# Patient Record
Sex: MALE
[De-identification: names, ages, dates, MRNs, and addresses within clinical notes are randomized per-mention and may not be internally consistent; named-entity substitution may affect disease eponyms.]

---

## 2018-05-22 ENCOUNTER — LABORATORY RESULT (OUTPATIENT)
Age: 33
End: 2018-05-22

## 2018-05-22 ENCOUNTER — APPOINTMENT (OUTPATIENT)
Dept: INTERNAL MEDICINE | Facility: CLINIC | Age: 33
End: 2018-05-22
Payer: COMMERCIAL

## 2018-05-22 VITALS
BODY MASS INDEX: 24.02 KG/M2 | HEIGHT: 71.26 IN | WEIGHT: 173.5 LBS | HEART RATE: 67 BPM | DIASTOLIC BLOOD PRESSURE: 77 MMHG | OXYGEN SATURATION: 98 % | SYSTOLIC BLOOD PRESSURE: 116 MMHG | TEMPERATURE: 98.4 F

## 2018-05-22 DIAGNOSIS — Z80.7 FAMILY HISTORY OF OTHER MALIGNANT NEOPLASMS OF LYMPHOID, HEMATOPOIETIC AND RELATED TISSUES: ICD-10-CM

## 2018-05-22 DIAGNOSIS — Z82.5 FAMILY HISTORY OF ASTHMA AND OTHER CHRONIC LOWER RESPIRATORY DISEASES: ICD-10-CM

## 2018-05-22 DIAGNOSIS — Z82.49 FAMILY HISTORY OF ISCHEMIC HEART DISEASE AND OTHER DISEASES OF THE CIRCULATORY SYSTEM: ICD-10-CM

## 2018-05-22 DIAGNOSIS — Z78.9 OTHER SPECIFIED HEALTH STATUS: ICD-10-CM

## 2018-05-22 DIAGNOSIS — Z82.3 FAMILY HISTORY OF STROKE: ICD-10-CM

## 2018-05-22 PROCEDURE — 36415 COLL VENOUS BLD VENIPUNCTURE: CPT

## 2018-05-22 PROCEDURE — 99385 PREV VISIT NEW AGE 18-39: CPT | Mod: 25

## 2018-05-22 NOTE — HISTORY OF PRESENT ILLNESS
[FreeTextEntry1] : Annual [de-identified] : Pt states his lack of focus is affecting his quality of work.  Constantly distracted and leaving unfinished projects.\par No nausea, vomiting, diarrhea, and constipation. No chest pain or shortness of breath.\par Gym regular, cardio,weights.  Plays golf.  Weight stable.  Cooks at home, fairly healthy.

## 2018-05-22 NOTE — HEALTH RISK ASSESSMENT
[Very Good] : ~his/her~  mood as very good [0] : 2) Feeling down, depressed, or hopeless: Not at all (0) [With Significant Other] : lives with significant other [Employed] : employed [College] : College [] :  [] : No [[id number]] : 0 [Change in mental status noted] : No change in mental status noted

## 2018-05-22 NOTE — HISTORY OF PRESENT ILLNESS
[FreeTextEntry1] : Annual [de-identified] : Pt states his lack of focus is affecting his quality of work.  Constantly distracted and leaving unfinished projects.\par No nausea, vomiting, diarrhea, and constipation. No chest pain or shortness of breath.\par Gym regular, cardio,weights.  Plays golf.  Weight stable.  Cooks at home, fairly healthy.

## 2018-05-22 NOTE — HEALTH RISK ASSESSMENT
[Very Good] : ~his/her~  mood as very good [0] : 2) Feeling down, depressed, or hopeless: Not at all (0) [With Significant Other] : lives with significant other [Employed] : employed [College] : College [] :  [] : No [[id number]] : 0 [Change in mental status noted] : No change in mental status noted

## 2018-05-30 LAB
ALBUMIN SERPL ELPH-MCNC: 4.8 G/DL
ALP BLD-CCNC: 62 U/L
ALT SERPL-CCNC: 14 U/L
ANION GAP SERPL CALC-SCNC: 13 MMOL/L
AST SERPL-CCNC: 17 U/L
BASOPHILS # BLD AUTO: 0.02 K/UL
BASOPHILS NFR BLD AUTO: 0.4 %
BILIRUB SERPL-MCNC: 0.7 MG/DL
BUN SERPL-MCNC: 14 MG/DL
CALCIUM SERPL-MCNC: 9.7 MG/DL
CHLORIDE SERPL-SCNC: 103 MMOL/L
CHOLEST SERPL-MCNC: 182 MG/DL
CHOLEST/HDLC SERPL: 2.4 RATIO
CO2 SERPL-SCNC: 27 MMOL/L
CREAT SERPL-MCNC: 0.72 MG/DL
EOSINOPHIL # BLD AUTO: 0.06 K/UL
EOSINOPHIL NFR BLD AUTO: 1.3 %
GLUCOSE SERPL-MCNC: 91 MG/DL
HCT VFR BLD CALC: 43.5 %
HDLC SERPL-MCNC: 77 MG/DL
HGB BLD-MCNC: 14.5 G/DL
IMM GRANULOCYTES NFR BLD AUTO: 0.2 %
LDLC SERPL CALC-MCNC: 94 MG/DL
LYMPHOCYTES # BLD AUTO: 2.29 K/UL
LYMPHOCYTES NFR BLD AUTO: 48.7 %
MAN DIFF?: NORMAL
MCHC RBC-ENTMCNC: 30.3 PG
MCHC RBC-ENTMCNC: 33.3 GM/DL
MCV RBC AUTO: 91 FL
MONOCYTES # BLD AUTO: 0.33 K/UL
MONOCYTES NFR BLD AUTO: 7 %
NEUTROPHILS # BLD AUTO: 1.99 K/UL
NEUTROPHILS NFR BLD AUTO: 42.4 %
PLATELET # BLD AUTO: 255 K/UL
POTASSIUM SERPL-SCNC: 4.6 MMOL/L
PROT SERPL-MCNC: 8 G/DL
RBC # BLD: 4.78 M/UL
RBC # FLD: 12.9 %
SODIUM SERPL-SCNC: 143 MMOL/L
TRIGL SERPL-MCNC: 54 MG/DL
TSH SERPL-ACNC: 0.89 UIU/ML
VIT B12 SERPL-MCNC: 433 PG/ML
WBC # FLD AUTO: 4.7 K/UL

## 2018-06-24 ENCOUNTER — LABORATORY RESULT (OUTPATIENT)
Age: 33
End: 2018-06-24

## 2018-06-25 ENCOUNTER — APPOINTMENT (OUTPATIENT)
Dept: DERMATOLOGY | Facility: CLINIC | Age: 33
End: 2018-06-25
Payer: COMMERCIAL

## 2018-06-25 VITALS — SYSTOLIC BLOOD PRESSURE: 111 MMHG | DIASTOLIC BLOOD PRESSURE: 64 MMHG

## 2018-06-25 DIAGNOSIS — Z85.828 PERSONAL HISTORY OF OTHER MALIGNANT NEOPLASM OF SKIN: ICD-10-CM

## 2018-06-25 DIAGNOSIS — D48.9 NEOPLASM OF UNCERTAIN BEHAVIOR, UNSPECIFIED: ICD-10-CM

## 2018-06-25 DIAGNOSIS — Z12.83 ENCOUNTER FOR SCREENING FOR MALIGNANT NEOPLASM OF SKIN: ICD-10-CM

## 2018-06-25 PROCEDURE — 11100 BX SKIN SUBCUTANEOUS&/MUCOUS MEMBRANE 1 LESION: CPT

## 2018-06-25 PROCEDURE — 99243 OFF/OP CNSLTJ NEW/EST LOW 30: CPT | Mod: 25

## 2018-07-23 ENCOUNTER — APPOINTMENT (OUTPATIENT)
Dept: DERMATOLOGY | Facility: CLINIC | Age: 33
End: 2018-07-23

## 2018-11-08 ENCOUNTER — APPOINTMENT (OUTPATIENT)
Dept: INTERNAL MEDICINE | Facility: CLINIC | Age: 33
End: 2018-11-08
Payer: COMMERCIAL

## 2018-11-08 VITALS
HEIGHT: 71.26 IN | HEART RATE: 98 BPM | TEMPERATURE: 98.5 F | SYSTOLIC BLOOD PRESSURE: 118 MMHG | WEIGHT: 170 LBS | OXYGEN SATURATION: 95 % | BODY MASS INDEX: 23.54 KG/M2 | DIASTOLIC BLOOD PRESSURE: 74 MMHG

## 2018-11-08 PROCEDURE — 99214 OFFICE O/P EST MOD 30 MIN: CPT

## 2018-11-08 NOTE — PHYSICAL EXAM
[No Acute Distress] : no acute distress [Well Nourished] : well nourished [Normal Sclera/Conjunctiva] : normal sclera/conjunctiva [EOMI] : extraocular movements intact [Normal Gait] : normal gait [Coordination Grossly Intact] : coordination grossly intact [Speech Grossly Normal] : speech grossly normal [Normal Mood] : the mood was normal

## 2018-11-08 NOTE — HISTORY OF PRESENT ILLNESS
[FreeTextEntry1] : Follow up [de-identified] : Here for ADHD follow up.  Have college pediatric notes.  Was on Concerta 36 up to 54 mg QD.  Work improved.  Then after college didn't take meds.  Work focus has decreased, wife sees lack of attention and focus.  Figits.  Doesn't finish projects, late with projects.\par No nausea, vomiting, diarrhea, and constipation. No chest pain or shortness of breath.\par

## 2018-12-05 ENCOUNTER — APPOINTMENT (OUTPATIENT)
Dept: INTERNAL MEDICINE | Facility: CLINIC | Age: 33
End: 2018-12-05

## 2018-12-19 ENCOUNTER — APPOINTMENT (OUTPATIENT)
Dept: INTERNAL MEDICINE | Facility: CLINIC | Age: 33
End: 2018-12-19
Payer: COMMERCIAL

## 2018-12-19 VITALS
DIASTOLIC BLOOD PRESSURE: 78 MMHG | SYSTOLIC BLOOD PRESSURE: 126 MMHG | RESPIRATION RATE: 14 BRPM | BODY MASS INDEX: 23.8 KG/M2 | WEIGHT: 170 LBS | HEIGHT: 71 IN | HEART RATE: 68 BPM | OXYGEN SATURATION: 98 %

## 2018-12-19 PROCEDURE — 99213 OFFICE O/P EST LOW 20 MIN: CPT

## 2018-12-19 NOTE — REVIEW OF SYSTEMS
[Fever] : no fever [Recent Change In Weight] : ~T no recent weight change [Chest Pain] : no chest pain [Palpitations] : no palpitations [Shortness Of Breath] : no shortness of breath [Cough] : no cough [Nausea] : no nausea [Vomiting] : no vomiting [Headache] : no headache [Dizziness] : no dizziness

## 2018-12-19 NOTE — HISTORY OF PRESENT ILLNESS
[FreeTextEntry1] : Follow [de-identified] : Has been taking Adderall BID.  Sometimes not taking second dose.  When takes, can follow and finish a task.  Decreased distraction.  Feels OK on this dose.  Perhaps a little weak but feels ok\par No nausea, vomiting, diarrhea, and constipation. No chest pain or shortness of breath.\par

## 2019-03-14 ENCOUNTER — APPOINTMENT (OUTPATIENT)
Dept: INTERNAL MEDICINE | Facility: CLINIC | Age: 34
End: 2019-03-14
Payer: COMMERCIAL

## 2019-03-14 VITALS
SYSTOLIC BLOOD PRESSURE: 122 MMHG | WEIGHT: 168 LBS | HEIGHT: 71 IN | DIASTOLIC BLOOD PRESSURE: 74 MMHG | OXYGEN SATURATION: 97 % | BODY MASS INDEX: 23.52 KG/M2 | HEART RATE: 84 BPM | TEMPERATURE: 98.1 F

## 2019-03-14 PROCEDURE — 99214 OFFICE O/P EST MOD 30 MIN: CPT

## 2019-03-15 NOTE — REVIEW OF SYSTEMS
[Earache] : earache [Nasal Discharge] : nasal discharge [Fever] : no fever [Night Sweats] : no night sweats [Discharge] : no discharge [Vision Problems] : no vision problems [Chest Pain] : no chest pain [Palpitations] : no palpitations [Shortness Of Breath] : no shortness of breath [Wheezing] : no wheezing [Nausea] : no nausea [Vomiting] : no vomiting [Joint Pain] : no joint pain [Back Pain] : no back pain

## 2019-03-15 NOTE — PLAN
[FreeTextEntry1] : Rest, drink plenty, bland diet, take Tylenol for fever or aches and return to office/ER if worsening symptoms such as increased fever, vomiting, worsening headache, or other concerns.\par Flonase

## 2019-03-15 NOTE — HISTORY OF PRESENT ILLNESS
[FreeTextEntry1] : Sick visit [de-identified] : 5 days ago had malaise, then congestion in nose and sinuses.  No fever.  No cough.  No CHOI/SOB.  No N/V/D/C.\par Has ear congestion.\par ADD med is effective when needed.  Long standing Dx ADD

## 2019-03-15 NOTE — PHYSICAL EXAM
[No Acute Distress] : no acute distress [Well Nourished] : well nourished [Normal Sclera/Conjunctiva] : normal sclera/conjunctiva [EOMI] : extraocular movements intact [Normal Oropharynx] : the oropharynx was normal [No JVD] : no jugular venous distention [No Lymphadenopathy] : no lymphadenopathy [No Respiratory Distress] : no respiratory distress  [Clear to Auscultation] : lungs were clear to auscultation bilaterally [Soft] : abdomen soft [Non Tender] : non-tender [Normal Anterior Cervical Nodes] : no anterior cervical lymphadenopathy [Normal Gait] : normal gait [Coordination Grossly Intact] : coordination grossly intact [Speech Grossly Normal] : speech grossly normal [Normal Mood] : the mood was normal [de-identified] : nasal congestion

## 2019-05-02 ENCOUNTER — APPOINTMENT (OUTPATIENT)
Dept: INTERNAL MEDICINE | Facility: CLINIC | Age: 34
End: 2019-05-02
Payer: COMMERCIAL

## 2019-05-02 VITALS
WEIGHT: 172 LBS | DIASTOLIC BLOOD PRESSURE: 76 MMHG | BODY MASS INDEX: 24.08 KG/M2 | SYSTOLIC BLOOD PRESSURE: 122 MMHG | HEIGHT: 71 IN | OXYGEN SATURATION: 98 % | HEART RATE: 86 BPM

## 2019-05-02 DIAGNOSIS — J06.9 ACUTE UPPER RESPIRATORY INFECTION, UNSPECIFIED: ICD-10-CM

## 2019-05-02 PROCEDURE — 99213 OFFICE O/P EST LOW 20 MIN: CPT

## 2019-05-02 NOTE — REVIEW OF SYSTEMS
[Night Sweats] : no night sweats [Recent Change In Weight] : ~T no recent weight change [Fever] : no fever [Earache] : no earache [Palpitations] : no palpitations [Chest Pain] : no chest pain [Hearing Loss] : no hearing loss [Wheezing] : no wheezing [Nausea] : no nausea [Shortness Of Breath] : no shortness of breath [Vomiting] : no vomiting [Headache] : no headache [Dizziness] : no dizziness

## 2019-05-02 NOTE — PHYSICAL EXAM
[No Acute Distress] : no acute distress [EOMI] : extraocular movements intact [Normal Sclera/Conjunctiva] : normal sclera/conjunctiva [Well Nourished] : well nourished [Normal Oropharynx] : the oropharynx was normal [Normal Outer Ear/Nose] : the outer ears and nose were normal in appearance [No Respiratory Distress] : no respiratory distress  [Regular Rhythm] : with a regular rhythm [Clear to Auscultation] : lungs were clear to auscultation bilaterally [Normal Rate] : normal rate  [Normal Gait] : normal gait [Coordination Grossly Intact] : coordination grossly intact [Speech Grossly Normal] : speech grossly normal [Normal Mood] : the mood was normal

## 2019-05-02 NOTE — HISTORY OF PRESENT ILLNESS
[FreeTextEntry1] : F/U with ADD and med's [de-identified] : Takes ritalin once a day over the work week.  Not on the weekends.  No palps.  Gained 4 lbs since last visit.  Appetite good.

## 2020-03-03 ENCOUNTER — APPOINTMENT (OUTPATIENT)
Dept: INTERNAL MEDICINE | Facility: CLINIC | Age: 35
End: 2020-03-03
Payer: COMMERCIAL

## 2020-03-03 VITALS
BODY MASS INDEX: 24.5 KG/M2 | SYSTOLIC BLOOD PRESSURE: 115 MMHG | DIASTOLIC BLOOD PRESSURE: 72 MMHG | OXYGEN SATURATION: 96 % | TEMPERATURE: 97.9 F | HEIGHT: 71 IN | HEART RATE: 103 BPM | WEIGHT: 175 LBS

## 2020-03-03 DIAGNOSIS — R82.90 UNSPECIFIED ABNORMAL FINDINGS IN URINE: ICD-10-CM

## 2020-03-03 PROCEDURE — 99395 PREV VISIT EST AGE 18-39: CPT | Mod: 25

## 2020-03-03 PROCEDURE — 36415 COLL VENOUS BLD VENIPUNCTURE: CPT

## 2020-03-03 NOTE — HISTORY OF PRESENT ILLNESS
[de-identified] : Pt has been losing focus again since off adderall.  Wasn't able to keep appt's.  Has taken since summer.  No nausea, vomiting, diarrhea, and constipation. No chest pain or shortness of breath.  Gym freq.\par Weight stable.  Eating healthily

## 2020-03-03 NOTE — HEALTH RISK ASSESSMENT
[Very Good] : ~his/her~  mood as very good [0] : 2) Feeling down, depressed, or hopeless: Not at all (0) [With Significant Other] : lives with significant other [None] : None [] :  [Employed] : employed [# Of Children ___] : has [unfilled] children [] : No [[id number]] : 0 [Change in mental status noted] : No change in mental status noted

## 2020-03-03 NOTE — PHYSICAL EXAM
[Well Developed] : well developed [No Acute Distress] : no acute distress [Well Nourished] : well nourished [Normal Sclera/Conjunctiva] : normal sclera/conjunctiva [Well-Appearing] : well-appearing [PERRL] : pupils equal round and reactive to light [EOMI] : extraocular movements intact [Normal Outer Ear/Nose] : the outer ears and nose were normal in appearance [Normal Oropharynx] : the oropharynx was normal [No Lymphadenopathy] : no lymphadenopathy [No JVD] : no jugular venous distention [Supple] : supple [No Respiratory Distress] : no respiratory distress  [Thyroid Normal, No Nodules] : the thyroid was normal and there were no nodules present [Clear to Auscultation] : lungs were clear to auscultation bilaterally [No Accessory Muscle Use] : no accessory muscle use [Regular Rhythm] : with a regular rhythm [Normal Rate] : normal rate  [No Murmur] : no murmur heard [Normal S1, S2] : normal S1 and S2 [No Carotid Bruits] : no carotid bruits [No Abdominal Bruit] : a ~M bruit was not heard ~T in the abdomen [No Varicosities] : no varicosities [Pedal Pulses Present] : the pedal pulses are present [No Edema] : there was no peripheral edema [No Palpable Aorta] : no palpable aorta [No Extremity Clubbing/Cyanosis] : no extremity clubbing/cyanosis [Soft] : abdomen soft [Non Tender] : non-tender [Non-distended] : non-distended [Normal Bowel Sounds] : normal bowel sounds [No Masses] : no abdominal mass palpated [No HSM] : no HSM [Normal Anterior Cervical Nodes] : no anterior cervical lymphadenopathy [Normal Posterior Cervical Nodes] : no posterior cervical lymphadenopathy [No CVA Tenderness] : no CVA  tenderness [No Spinal Tenderness] : no spinal tenderness [No Joint Swelling] : no joint swelling [No Rash] : no rash [Grossly Normal Strength/Tone] : grossly normal strength/tone [No Focal Deficits] : no focal deficits [Coordination Grossly Intact] : coordination grossly intact [Normal Gait] : normal gait [Normal Affect] : the affect was normal [Deep Tendon Reflexes (DTR)] : deep tendon reflexes were 2+ and symmetric [Normal Insight/Judgement] : insight and judgment were intact

## 2020-03-10 LAB
ALBUMIN SERPL ELPH-MCNC: 5.1 G/DL
ALP BLD-CCNC: 64 U/L
ALT SERPL-CCNC: 15 U/L
ANION GAP SERPL CALC-SCNC: 13 MMOL/L
AST SERPL-CCNC: 18 U/L
BASOPHILS # BLD AUTO: 0.04 K/UL
BASOPHILS NFR BLD AUTO: 0.9 %
BILIRUB SERPL-MCNC: 0.6 MG/DL
BUN SERPL-MCNC: 16 MG/DL
CALCIUM SERPL-MCNC: 10.3 MG/DL
CHLORIDE SERPL-SCNC: 105 MMOL/L
CHOLEST SERPL-MCNC: 175 MG/DL
CHOLEST/HDLC SERPL: 2.4 RATIO
CO2 SERPL-SCNC: 25 MMOL/L
CREAT SERPL-MCNC: 0.88 MG/DL
EOSINOPHIL # BLD AUTO: 0.05 K/UL
EOSINOPHIL NFR BLD AUTO: 1.1 %
GLUCOSE SERPL-MCNC: 69 MG/DL
HCT VFR BLD CALC: 43.8 %
HDLC SERPL-MCNC: 73 MG/DL
HGB BLD-MCNC: 14.5 G/DL
IMM GRANULOCYTES NFR BLD AUTO: 0.2 %
LDLC SERPL CALC-MCNC: 80 MG/DL
LYMPHOCYTES # BLD AUTO: 2.11 K/UL
LYMPHOCYTES NFR BLD AUTO: 45.7 %
MAN DIFF?: NORMAL
MCHC RBC-ENTMCNC: 30.2 PG
MCHC RBC-ENTMCNC: 33.1 GM/DL
MCV RBC AUTO: 91.3 FL
MONOCYTES # BLD AUTO: 0.39 K/UL
MONOCYTES NFR BLD AUTO: 8.4 %
NEUTROPHILS # BLD AUTO: 2.02 K/UL
NEUTROPHILS NFR BLD AUTO: 43.7 %
PLATELET # BLD AUTO: 264 K/UL
POTASSIUM SERPL-SCNC: 4.6 MMOL/L
PROT SERPL-MCNC: 7.5 G/DL
RBC # BLD: 4.8 M/UL
RBC # FLD: 12.1 %
SODIUM SERPL-SCNC: 142 MMOL/L
TRIGL SERPL-MCNC: 109 MG/DL
TSH SERPL-ACNC: 1.12 UIU/ML
WBC # FLD AUTO: 4.62 K/UL

## 2020-08-20 ENCOUNTER — APPOINTMENT (OUTPATIENT)
Dept: INTERNAL MEDICINE | Facility: CLINIC | Age: 35
End: 2020-08-20
Payer: COMMERCIAL

## 2020-08-20 VITALS
SYSTOLIC BLOOD PRESSURE: 112 MMHG | WEIGHT: 170 LBS | BODY MASS INDEX: 23.8 KG/M2 | DIASTOLIC BLOOD PRESSURE: 72 MMHG | OXYGEN SATURATION: 100 % | TEMPERATURE: 98.2 F | HEIGHT: 71 IN | HEART RATE: 74 BPM

## 2020-08-20 DIAGNOSIS — L03.011 CELLULITIS OF RIGHT FINGER: ICD-10-CM

## 2020-08-20 PROCEDURE — 99213 OFFICE O/P EST LOW 20 MIN: CPT

## 2020-08-20 NOTE — HISTORY OF PRESENT ILLNESS
[de-identified] : R 4th finger cuticle red, swollen painful.  Improved overnight with warm salt water soaks.\par Also needs refill on his ADD medication\par No nausea, vomiting, diarrhea, and constipation. No chest pain or shortness of breath.\par

## 2020-08-20 NOTE — PHYSICAL EXAM
[No Acute Distress] : no acute distress [Well Nourished] : well nourished [Speech Grossly Normal] : speech grossly normal [Normal Mood] : the mood was normal [de-identified] : erthema tender 4th cuticle.

## 2021-01-13 ENCOUNTER — APPOINTMENT (OUTPATIENT)
Dept: INTERNAL MEDICINE | Facility: CLINIC | Age: 36
End: 2021-01-13
Payer: COMMERCIAL

## 2021-01-13 VITALS
SYSTOLIC BLOOD PRESSURE: 125 MMHG | HEART RATE: 84 BPM | WEIGHT: 170 LBS | OXYGEN SATURATION: 100 % | BODY MASS INDEX: 23.8 KG/M2 | TEMPERATURE: 98.2 F | HEIGHT: 71 IN | DIASTOLIC BLOOD PRESSURE: 78 MMHG

## 2021-01-13 DIAGNOSIS — R48.0 DYSLEXIA AND ALEXIA: ICD-10-CM

## 2021-01-13 PROCEDURE — 99213 OFFICE O/P EST LOW 20 MIN: CPT

## 2021-01-13 PROCEDURE — 99072 ADDL SUPL MATRL&STAF TM PHE: CPT

## 2021-01-13 RX ORDER — CEPHALEXIN 250 MG/1
250 TABLET ORAL EVERY 8 HOURS
Qty: 15 | Refills: 0 | Status: DISCONTINUED | COMMUNITY
Start: 2020-08-20 | End: 2021-01-13

## 2021-01-13 NOTE — PLAN
[FreeTextEntry1] : Possible side effects and possible adverse reactions discussed at length with the patients.  All patient's questions addressed pertaining to medication issues.\par f/u 3 mo

## 2021-01-13 NOTE — PHYSICAL EXAM
[No Acute Distress] : no acute distress [Well Nourished] : well nourished [Normal Sclera/Conjunctiva] : normal sclera/conjunctiva [EOMI] : extraocular movements intact [Normal Outer Ear/Nose] : the outer ears and nose were normal in appearance [No JVD] : no jugular venous distention [No Lymphadenopathy] : no lymphadenopathy [No Respiratory Distress] : no respiratory distress  [Clear to Auscultation] : lungs were clear to auscultation bilaterally [Regular Rhythm] : with a regular rhythm [No Murmur] : no murmur heard [Soft] : abdomen soft [Non Tender] : non-tender [No Rash] : no rash [Coordination Grossly Intact] : coordination grossly intact [Normal Gait] : normal gait [Speech Grossly Normal] : speech grossly normal [Memory Grossly Normal] : memory grossly normal

## 2021-01-13 NOTE — HISTORY OF PRESENT ILLNESS
[FreeTextEntry1] : Follow up [de-identified] : Has been well during the pandemic.  Has tested negative PCR.\par Using the methylphenidate.  Takes at least TIW.  No nausea, vomiting, diarrhea, and constipation. No chest pain or shortness of breath.\par Exercising regular with online exercise classes.

## 2021-01-20 LAB
SARS-COV-2 IGG SERPL IA-ACNC: <0.1 INDEX
SARS-COV-2 IGG SERPL QL IA: NEGATIVE
SARS-COV-2 N GENE NPH QL NAA+PROBE: NOT DETECTED

## 2021-03-10 ENCOUNTER — APPOINTMENT (OUTPATIENT)
Dept: INTERNAL MEDICINE | Facility: CLINIC | Age: 36
End: 2021-03-10
Payer: COMMERCIAL

## 2021-03-10 VITALS
DIASTOLIC BLOOD PRESSURE: 71 MMHG | HEART RATE: 65 BPM | SYSTOLIC BLOOD PRESSURE: 122 MMHG | HEIGHT: 71 IN | BODY MASS INDEX: 24.22 KG/M2 | WEIGHT: 173 LBS | TEMPERATURE: 97.5 F | OXYGEN SATURATION: 98 %

## 2021-03-10 PROCEDURE — 99072 ADDL SUPL MATRL&STAF TM PHE: CPT

## 2021-03-10 PROCEDURE — 99213 OFFICE O/P EST LOW 20 MIN: CPT

## 2021-03-10 NOTE — PHYSICAL EXAM
[No Acute Distress] : no acute distress [Well Nourished] : well nourished [Normal Sclera/Conjunctiva] : normal sclera/conjunctiva [EOMI] : extraocular movements intact [Normal Outer Ear/Nose] : the outer ears and nose were normal in appearance [Normal Oropharynx] : the oropharynx was normal [No JVD] : no jugular venous distention [Supple] : supple [No Respiratory Distress] : no respiratory distress  [No Accessory Muscle Use] : no accessory muscle use [Normal Rate] : normal rate  [Regular Rhythm] : with a regular rhythm [Soft] : abdomen soft [No HSM] : no HSM

## 2021-03-12 NOTE — PLAN
[FreeTextEntry1] : Possible side effects and possible adverse reactions discussed at length with the patients.  All patient's questions addressed pertaining to medication issues.\par Q3mo f/u

## 2021-03-12 NOTE — HISTORY OF PRESENT ILLNESS
[FreeTextEntry1] : F/U ADD [de-identified] : Cardio and weights for exercise.  Protein supplements.\par No nausea, vomiting, diarrhea, and constipation. No chest pain or shortness of breath.\par

## 2021-03-12 NOTE — REVIEW OF SYSTEMS
[Fever] : no fever [Discharge] : no discharge [Vision Problems] : no vision problems [Earache] : no earache [Nasal Discharge] : no nasal discharge [Chest Pain] : no chest pain [Shortness Of Breath] : no shortness of breath [Wheezing] : no wheezing [Nausea] : no nausea [Vomiting] : no vomiting [Dysuria] : no dysuria [Hematuria] : no hematuria

## 2021-06-09 ENCOUNTER — APPOINTMENT (OUTPATIENT)
Dept: INTERNAL MEDICINE | Facility: CLINIC | Age: 36
End: 2021-06-09

## 2021-08-24 ENCOUNTER — NON-APPOINTMENT (OUTPATIENT)
Age: 36
End: 2021-08-24

## 2021-08-24 ENCOUNTER — APPOINTMENT (OUTPATIENT)
Dept: INTERNAL MEDICINE | Facility: CLINIC | Age: 36
End: 2021-08-24
Payer: COMMERCIAL

## 2021-08-24 DIAGNOSIS — A08.4 VIRAL INTESTINAL INFECTION, UNSPECIFIED: ICD-10-CM

## 2021-08-24 PROCEDURE — 99213 OFFICE O/P EST LOW 20 MIN: CPT | Mod: 95

## 2021-08-24 RX ORDER — OMEPRAZOLE 40 MG/1
40 CAPSULE, DELAYED RELEASE ORAL
Qty: 14 | Refills: 0 | Status: ACTIVE | COMMUNITY
Start: 2021-08-24 | End: 1900-01-01

## 2021-08-25 NOTE — PLAN
[FreeTextEntry1] : Possible side effects and possible adverse reactions discussed at length with the patients.  All patient's questions addressed pertaining to medication issues.\par \par 22 min reviewing hx, food and drink intake, progress, past Tx and future mgt.\par \par Drink plenty of fluids, take med's as instructed, eat a BRAT diet until symptoms begin to improve.  RTO/ER if worsening abd pain, fever, dehydration or other concerns.\par \par

## 2021-08-25 NOTE — HISTORY OF PRESENT ILLNESS
[de-identified] : Four days of dyspepsia, no vomiting, some discomfort superior to umbilicus.  No fever or malaise.  Having loose BMs, no BRBPR.  No mucus 3-4 x's a day.  Has been at a few work gatherings eating various foods.  No constipation. No chest pain or shortness of breath.\par

## 2021-08-25 NOTE — REVIEW OF SYSTEMS
[Fever] : no fever [Night Sweats] : no night sweats [Chest Pain] : no chest pain [Palpitations] : no palpitations [Shortness Of Breath] : no shortness of breath [Cough] : no cough [Vomiting] : no vomiting [Melena] : no melena

## 2022-01-10 ENCOUNTER — APPOINTMENT (OUTPATIENT)
Dept: INTERNAL MEDICINE | Facility: CLINIC | Age: 37
End: 2022-01-10
Payer: COMMERCIAL

## 2022-01-10 VITALS
WEIGHT: 180 LBS | DIASTOLIC BLOOD PRESSURE: 77 MMHG | HEART RATE: 76 BPM | OXYGEN SATURATION: 98 % | SYSTOLIC BLOOD PRESSURE: 138 MMHG | BODY MASS INDEX: 25.2 KG/M2 | HEIGHT: 71 IN | TEMPERATURE: 96.2 F

## 2022-01-10 DIAGNOSIS — M25.512 PAIN IN LEFT SHOULDER: ICD-10-CM

## 2022-01-10 DIAGNOSIS — Z11.59 ENCOUNTER FOR SCREENING FOR OTHER VIRAL DISEASES: ICD-10-CM

## 2022-01-10 DIAGNOSIS — G89.29 PAIN IN LEFT SHOULDER: ICD-10-CM

## 2022-01-10 PROCEDURE — 99395 PREV VISIT EST AGE 18-39: CPT | Mod: 25

## 2022-01-10 PROCEDURE — 36415 COLL VENOUS BLD VENIPUNCTURE: CPT

## 2022-01-10 NOTE — HEALTH RISK ASSESSMENT
[Very Good] : ~his/her~  mood as very good [Never] : Never [0] : 2) Feeling down, depressed, or hopeless: Not at all (0) [None] : None [With Family] : lives with family [Employed] : employed [] :  [[id number]] : 0 [Language] : denies difficulty with language

## 2022-01-10 NOTE — HISTORY OF PRESENT ILLNESS
[de-identified] : No nausea, vomiting, diarrhea, and constipation. No chest pain or shortness of breath.  Uses stimulant med during work week.  Not on weekends.\par

## 2022-01-20 LAB
ALBUMIN SERPL ELPH-MCNC: 4.9 G/DL
ALP BLD-CCNC: 77 U/L
ALT SERPL-CCNC: 18 U/L
ANION GAP SERPL CALC-SCNC: 14 MMOL/L
AST SERPL-CCNC: 20 U/L
BASOPHILS # BLD AUTO: 0.04 K/UL
BASOPHILS NFR BLD AUTO: 0.6 %
BILIRUB SERPL-MCNC: 0.6 MG/DL
BUN SERPL-MCNC: 10 MG/DL
CALCIUM SERPL-MCNC: 10.1 MG/DL
CHLORIDE SERPL-SCNC: 98 MMOL/L
CHOLEST SERPL-MCNC: 176 MG/DL
CO2 SERPL-SCNC: 26 MMOL/L
COVID-19 NUCLEOCAPSID  GAM ANTIBODY INTERPRETATION: NEGATIVE
CREAT SERPL-MCNC: 0.83 MG/DL
EOSINOPHIL # BLD AUTO: 0.04 K/UL
EOSINOPHIL NFR BLD AUTO: 0.6 %
GLUCOSE SERPL-MCNC: 94 MG/DL
HCT VFR BLD CALC: 45.5 %
HDLC SERPL-MCNC: 69 MG/DL
HGB BLD-MCNC: 14.7 G/DL
IMM GRANULOCYTES NFR BLD AUTO: 0.3 %
LDLC SERPL CALC-MCNC: 89 MG/DL
LYMPHOCYTES # BLD AUTO: 1.83 K/UL
LYMPHOCYTES NFR BLD AUTO: 25.4 %
MAN DIFF?: NORMAL
MCHC RBC-ENTMCNC: 30 PG
MCHC RBC-ENTMCNC: 32.3 GM/DL
MCV RBC AUTO: 92.9 FL
MONOCYTES # BLD AUTO: 0.62 K/UL
MONOCYTES NFR BLD AUTO: 8.6 %
NEUTROPHILS # BLD AUTO: 4.65 K/UL
NEUTROPHILS NFR BLD AUTO: 64.5 %
NONHDLC SERPL-MCNC: 107 MG/DL
PLATELET # BLD AUTO: 284 K/UL
POTASSIUM SERPL-SCNC: 4.4 MMOL/L
PROT SERPL-MCNC: 7.6 G/DL
RBC # BLD: 4.9 M/UL
RBC # FLD: 12.1 %
SARS-COV-2 AB SERPL QL IA: 0.07 INDEX
SODIUM SERPL-SCNC: 138 MMOL/L
TRIGL SERPL-MCNC: 92 MG/DL
TSH SERPL-ACNC: 0.97 UIU/ML
WBC # FLD AUTO: 7.2 K/UL

## 2022-03-29 ENCOUNTER — APPOINTMENT (OUTPATIENT)
Dept: ORTHOPEDIC SURGERY | Facility: CLINIC | Age: 37
End: 2022-03-29

## 2022-04-11 PROBLEM — Z11.59 SCREENING FOR VIRAL DISEASE: Status: ACTIVE | Noted: 2021-01-13

## 2022-12-01 ENCOUNTER — APPOINTMENT (OUTPATIENT)
Dept: INTERNAL MEDICINE | Facility: CLINIC | Age: 37
End: 2022-12-01

## 2022-12-01 VITALS
HEIGHT: 71 IN | DIASTOLIC BLOOD PRESSURE: 63 MMHG | HEART RATE: 76 BPM | TEMPERATURE: 97.2 F | BODY MASS INDEX: 25.2 KG/M2 | SYSTOLIC BLOOD PRESSURE: 123 MMHG | WEIGHT: 180 LBS | OXYGEN SATURATION: 97 %

## 2022-12-01 DIAGNOSIS — S76.012A STRAIN OF MUSCLE, FASCIA AND TENDON OF LEFT HIP, INITIAL ENCOUNTER: ICD-10-CM

## 2022-12-01 PROCEDURE — 99213 OFFICE O/P EST LOW 20 MIN: CPT

## 2022-12-05 NOTE — HISTORY OF PRESENT ILLNESS
[FreeTextEntry1] : ADD med's [de-identified] : No nausea, vomiting, diarrhea, and constipation. No chest pain or shortness of breath.  Having some hip discomfort from a long drive.\par

## 2022-12-05 NOTE — PHYSICAL EXAM
[No Acute Distress] : no acute distress [Well Nourished] : well nourished [Normal Sclera/Conjunctiva] : normal sclera/conjunctiva [EOMI] : extraocular movements intact [No Respiratory Distress] : no respiratory distress  [Clear to Auscultation] : lungs were clear to auscultation bilaterally [Regular Rhythm] : with a regular rhythm [No Murmur] : no murmur heard [Soft] : abdomen soft [Non Tender] : non-tender

## 2023-03-13 ENCOUNTER — APPOINTMENT (OUTPATIENT)
Dept: INTERNAL MEDICINE | Facility: CLINIC | Age: 38
End: 2023-03-13
Payer: COMMERCIAL

## 2023-03-13 VITALS
SYSTOLIC BLOOD PRESSURE: 134 MMHG | WEIGHT: 180 LBS | HEART RATE: 83 BPM | DIASTOLIC BLOOD PRESSURE: 74 MMHG | TEMPERATURE: 97.7 F | BODY MASS INDEX: 25.2 KG/M2 | HEIGHT: 71 IN | OXYGEN SATURATION: 95 %

## 2023-03-13 DIAGNOSIS — H91.90 UNSPECIFIED HEARING LOSS, UNSPECIFIED EAR: ICD-10-CM

## 2023-03-13 PROCEDURE — 36415 COLL VENOUS BLD VENIPUNCTURE: CPT

## 2023-03-13 PROCEDURE — 99213 OFFICE O/P EST LOW 20 MIN: CPT | Mod: 25

## 2023-03-13 NOTE — HISTORY OF PRESENT ILLNESS
[FreeTextEntry1] : neck swelling [de-identified] : R neck enlargement for a few month's.  No night sweats or weight loss.  Fam Hx Lymphopma.  No nausea, vomiting, diarrhea, and constipation. No chest pain or shortness of breath.\par Decreased hearing chronically\par

## 2023-03-13 NOTE — REVIEW OF SYSTEMS
[Fever] : no fever [Fatigue] : no fatigue [Night Sweats] : no night sweats [Recent Change In Weight] : ~T no recent weight change [Chest Pain] : no chest pain [Palpitations] : no palpitations [Shortness Of Breath] : no shortness of breath [Cough] : no cough

## 2023-03-13 NOTE — PHYSICAL EXAM
[No Acute Distress] : no acute distress [Well Nourished] : well nourished [Normal Sclera/Conjunctiva] : normal sclera/conjunctiva [EOMI] : extraocular movements intact [Normal Oropharynx] : the oropharynx was normal [Normal TMs] : both tympanic membranes were normal [No Lymphadenopathy] : no lymphadenopathy [Supple] : supple [Thyroid Normal, No Nodules] : the thyroid was normal and there were no nodules present [Normal Supraclavicular Nodes] : no supraclavicular lymphadenopathy [Normal Axillary Nodes] : no axillary lymphadenopathy [Normal Posterior Cervical Nodes] : no posterior cervical lymphadenopathy [Normal Anterior Cervical Nodes] : no anterior cervical lymphadenopathy [de-identified] : Increased thickness of bilateral sternocleidomastoid muscles

## 2023-03-21 LAB
ALBUMIN SERPL ELPH-MCNC: 5.2 G/DL
ALP BLD-CCNC: 81 U/L
ALT SERPL-CCNC: 16 U/L
ANION GAP SERPL CALC-SCNC: 13 MMOL/L
AST SERPL-CCNC: 16 U/L
BASOPHILS # BLD AUTO: 0.04 K/UL
BASOPHILS NFR BLD AUTO: 0.7 %
BILIRUB SERPL-MCNC: 0.5 MG/DL
BUN SERPL-MCNC: 19 MG/DL
CALCIUM SERPL-MCNC: 10.5 MG/DL
CHLORIDE SERPL-SCNC: 100 MMOL/L
CO2 SERPL-SCNC: 28 MMOL/L
CREAT SERPL-MCNC: 0.82 MG/DL
EGFR: 117 ML/MIN/1.73M2
EOSINOPHIL # BLD AUTO: 0.06 K/UL
EOSINOPHIL NFR BLD AUTO: 1 %
ERYTHROCYTE [SEDIMENTATION RATE] IN BLOOD BY WESTERGREN METHOD: 3 MM/HR
GLUCOSE SERPL-MCNC: 95 MG/DL
HCT VFR BLD CALC: 45.7 %
HGB BLD-MCNC: 15 G/DL
IMM GRANULOCYTES NFR BLD AUTO: 0.3 %
LDH SERPL-CCNC: 161 U/L
LYMPHOCYTES # BLD AUTO: 2.19 K/UL
LYMPHOCYTES NFR BLD AUTO: 38 %
MAN DIFF?: NORMAL
MCHC RBC-ENTMCNC: 29.4 PG
MCHC RBC-ENTMCNC: 32.8 GM/DL
MCV RBC AUTO: 89.6 FL
MONOCYTES # BLD AUTO: 0.49 K/UL
MONOCYTES NFR BLD AUTO: 8.5 %
NEUTROPHILS # BLD AUTO: 2.96 K/UL
NEUTROPHILS NFR BLD AUTO: 51.5 %
PLATELET # BLD AUTO: 298 K/UL
POTASSIUM SERPL-SCNC: 4.9 MMOL/L
PROT SERPL-MCNC: 7.9 G/DL
RBC # BLD: 5.1 M/UL
RBC # FLD: 11.9 %
SODIUM SERPL-SCNC: 140 MMOL/L
T4 FREE SERPL-MCNC: 1.2 NG/DL
THYROGLOB AB SERPL-ACNC: <20 IU/ML
THYROPEROXIDASE AB SERPL IA-ACNC: <10 IU/ML
TSH SERPL-ACNC: 1.21 UIU/ML
WBC # FLD AUTO: 5.76 K/UL

## 2023-03-28 ENCOUNTER — APPOINTMENT (OUTPATIENT)
Dept: OTOLARYNGOLOGY | Facility: CLINIC | Age: 38
End: 2023-03-28
Payer: COMMERCIAL

## 2023-03-28 ENCOUNTER — NON-APPOINTMENT (OUTPATIENT)
Age: 38
End: 2023-03-28

## 2023-03-28 VITALS
SYSTOLIC BLOOD PRESSURE: 99 MMHG | HEART RATE: 68 BPM | TEMPERATURE: 97.9 F | WEIGHT: 180 LBS | DIASTOLIC BLOOD PRESSURE: 63 MMHG | BODY MASS INDEX: 25.2 KG/M2 | HEIGHT: 71 IN | OXYGEN SATURATION: 98 %

## 2023-03-28 DIAGNOSIS — R41.89 OTHER SYMPTOMS AND SIGNS INVOLVING COGNITIVE FUNCTIONS AND AWARENESS: ICD-10-CM

## 2023-03-28 DIAGNOSIS — R22.1 LOCALIZED SWELLING, MASS AND LUMP, NECK: ICD-10-CM

## 2023-03-28 PROCEDURE — 92557 COMPREHENSIVE HEARING TEST: CPT

## 2023-03-28 PROCEDURE — 92504 EAR MICROSCOPY EXAMINATION: CPT

## 2023-03-28 PROCEDURE — 99204 OFFICE O/P NEW MOD 45 MIN: CPT | Mod: 25

## 2023-03-28 PROCEDURE — 92550 TYMPANOMETRY & REFLEX THRESH: CPT | Mod: 52

## 2023-03-28 PROCEDURE — 31575 DIAGNOSTIC LARYNGOSCOPY: CPT

## 2023-03-28 NOTE — PROCEDURE
[de-identified] : Indication: requirement for exam not possible via anterior examination; globus\par After verbal consent and the administration of an aerosolized phenylephrine/lidocaine mix, examination was performed with a flexible endoscope placed through the nose which was attached to a video monitoring system. Findings:\par Nasopharynx: unremarkable\par Soft palate, lateral and posterior pharyngeal walls: unremarkable\par Base of tongue & lingual tonsil: minimal retrodisplacement\par Vallecula: unremarkable\par Epiglottis: unremarkable\par Piriform sinuses and pharyngoesophageal junction: unremarkable\par Arytenoids and AE folds: mild interarytenoid edema\par Ventricle and false vocal folds: unremarkable\par True vocal folds: Smooth free edge; surface without ectasias or edema; normal movement bilaterally with good apposition in phonation\par Visible subglottis: unremarkable\par Narrow-band imaging: not used\par Other findings: ELM

## 2023-03-28 NOTE — ASSESSMENT
[FreeTextEntry1] : Agree w/ the pending sono; we discussed a possible neck CT but will see what the sono shows first\par \par Reassured him regarding his hearing

## 2023-03-28 NOTE — PHYSICAL EXAM
[de-identified] : R level 3/4 generalized fullness w/o obvious LAD [Binocular Microscopic Exam] : Binocular microscopic exam was performed [Normal] : no masses and lesions seen, face is symmetric

## 2023-03-28 NOTE — REASON FOR VISIT
[Initial Consultation] : an initial consultation for [FreeTextEntry2] : trouble hearing, neck swelling

## 2023-03-28 NOTE — HISTORY OF PRESENT ILLNESS
[de-identified] : R neck prominence (points at level 3) for a few weeks; an ultrasound is pending. No preceding infections; no dysphagia, nosebleeds, dysphonia or unexp. wt loss. \par Secondly he c/o a few months of possible trouble hearing & complaints from his wife about this. Denies: vertigo, ear pain, drainage, frequent loud noise exp/shooting, frequent infections, hx of ear surgery or IV antibiotics/chemo; denies a FHx of hearing loss. Qtip use: yes

## 2023-03-28 NOTE — DATA REVIEWED
[de-identified] : today: normal hearing AU;  AU\par - immitance testing w/ type A AU [de-identified] : 3/23: nl CMP, CBC, TSH

## 2023-03-28 NOTE — CONSULT LETTER
[Dear  ___] : Dear  [unfilled], [Consult Letter:] : I had the pleasure of evaluating your patient, [unfilled]. [Please see my note below.] : Please see my note below. [Consult Closing:] : Thank you very much for allowing me to participate in the care of this patient.  If you have any questions, please do not hesitate to contact me. [Sincerely,] : Sincerely, [FreeTextEntry3] : JACINTO Albert Jr, MD, FAAOHNS\par Otolaryngologist\par [facility]

## 2023-03-31 ENCOUNTER — OUTPATIENT (OUTPATIENT)
Dept: OUTPATIENT SERVICES | Facility: HOSPITAL | Age: 38
LOS: 1 days | End: 2023-03-31

## 2023-03-31 ENCOUNTER — APPOINTMENT (OUTPATIENT)
Dept: ULTRASOUND IMAGING | Facility: CLINIC | Age: 38
End: 2023-03-31
Payer: COMMERCIAL

## 2023-03-31 PROCEDURE — 76536 US EXAM OF HEAD AND NECK: CPT | Mod: 26

## 2023-06-14 ENCOUNTER — APPOINTMENT (OUTPATIENT)
Dept: INTERNAL MEDICINE | Facility: CLINIC | Age: 38
End: 2023-06-14
Payer: COMMERCIAL

## 2023-06-14 VITALS
HEART RATE: 59 BPM | BODY MASS INDEX: 25.2 KG/M2 | OXYGEN SATURATION: 98 % | WEIGHT: 180 LBS | DIASTOLIC BLOOD PRESSURE: 70 MMHG | HEIGHT: 71 IN | SYSTOLIC BLOOD PRESSURE: 115 MMHG | TEMPERATURE: 96.8 F

## 2023-06-14 DIAGNOSIS — Z00.00 ENCOUNTER FOR GENERAL ADULT MEDICAL EXAMINATION W/OUT ABNORMAL FINDINGS: ICD-10-CM

## 2023-06-14 DIAGNOSIS — Z23 ENCOUNTER FOR IMMUNIZATION: ICD-10-CM

## 2023-06-14 PROCEDURE — 99395 PREV VISIT EST AGE 18-39: CPT | Mod: 25

## 2023-06-14 PROCEDURE — 36415 COLL VENOUS BLD VENIPUNCTURE: CPT

## 2023-06-14 PROCEDURE — 90715 TDAP VACCINE 7 YRS/> IM: CPT

## 2023-06-14 PROCEDURE — 90471 IMMUNIZATION ADMIN: CPT

## 2023-06-14 NOTE — PHYSICAL EXAM
[No Acute Distress] : no acute distress [Well Nourished] : well nourished [Well Developed] : well developed [Well-Appearing] : well-appearing [Normal Sclera/Conjunctiva] : normal sclera/conjunctiva [PERRL] : pupils equal round and reactive to light [EOMI] : extraocular movements intact [Normal Outer Ear/Nose] : the outer ears and nose were normal in appearance [Normal Oropharynx] : the oropharynx was normal [No JVD] : no jugular venous distention [No Lymphadenopathy] : no lymphadenopathy [Thyroid Normal, No Nodules] : the thyroid was normal and there were no nodules present [Supple] : supple [No Respiratory Distress] : no respiratory distress  [No Accessory Muscle Use] : no accessory muscle use [Clear to Auscultation] : lungs were clear to auscultation bilaterally [Normal Rate] : normal rate  [Regular Rhythm] : with a regular rhythm [Normal S1, S2] : normal S1 and S2 [No Murmur] : no murmur heard [No Carotid Bruits] : no carotid bruits [No Abdominal Bruit] : a ~M bruit was not heard ~T in the abdomen [No Varicosities] : no varicosities [Pedal Pulses Present] : the pedal pulses are present [No Edema] : there was no peripheral edema [No Palpable Aorta] : no palpable aorta [No Extremity Clubbing/Cyanosis] : no extremity clubbing/cyanosis [Soft] : abdomen soft [Non Tender] : non-tender [Non-distended] : non-distended [No Masses] : no abdominal mass palpated [No HSM] : no HSM [Normal Bowel Sounds] : normal bowel sounds [Normal Posterior Cervical Nodes] : no posterior cervical lymphadenopathy [Normal Anterior Cervical Nodes] : no anterior cervical lymphadenopathy [No CVA Tenderness] : no CVA  tenderness [No Spinal Tenderness] : no spinal tenderness [No Joint Swelling] : no joint swelling [Grossly Normal Strength/Tone] : grossly normal strength/tone [No Rash] : no rash [No Focal Deficits] : no focal deficits [Coordination Grossly Intact] : coordination grossly intact [Normal Gait] : normal gait [Deep Tendon Reflexes (DTR)] : deep tendon reflexes were 2+ and symmetric [Speech Grossly Normal] : speech grossly normal [Normal Mood] : the mood was normal [Normal Affect] : the affect was normal [Normal Insight/Judgement] : insight and judgment were intact

## 2023-06-15 NOTE — HISTORY OF PRESENT ILLNESS
[de-identified] : No nausea, vomiting, diarrhea, and constipation. No chest pain or shortness of breath.  Not exercising as much. \par

## 2024-02-27 ENCOUNTER — APPOINTMENT (OUTPATIENT)
Dept: INTERNAL MEDICINE | Facility: CLINIC | Age: 39
End: 2024-02-27
Payer: COMMERCIAL

## 2024-02-27 DIAGNOSIS — F98.8 OTHER SPECIFIED BEHAVIORAL AND EMOTIONAL DISORDERS WITH ONSET USUALLY OCCURRING IN CHILDHOOD AND ADOLESCENCE: ICD-10-CM

## 2024-02-27 PROCEDURE — 99213 OFFICE O/P EST LOW 20 MIN: CPT

## 2024-02-27 RX ORDER — METHYLPHENIDATE HYDROCHLORIDE 10 MG/1
10 TABLET ORAL
Qty: 60 | Refills: 0 | Status: ACTIVE | COMMUNITY
Start: 2018-11-08 | End: 1900-01-01

## 2024-02-27 NOTE — HISTORY OF PRESENT ILLNESS
[Other Location: e.g. School (Enter Location, City,State)___] : at [unfilled], at the time of the visit. [Verbal consent obtained from patient] : the patient, [unfilled] [Medical Office: ([facility])___] : at the medical office located in  [FreeTextEntry1] : Med refill [de-identified] : Pt reports feeling well today. He is requesting refill of his ADD medication: Methylphenidate 10mg which he reports compliance with and is aware of all side effects. Pt denies chest pain, sob, nausea, vomiting, diarrhea, abdominal pain, tremors, suicidal thoughts/thoughts of harming himself, behavioral disturbances, withdrawal symptoms, anxiety, insomnia, depression. Pt states he has been tolerating this medication well and denies adverse effects.

## 2024-07-18 ENCOUNTER — APPOINTMENT (OUTPATIENT)
Dept: INTERNAL MEDICINE | Facility: CLINIC | Age: 39
End: 2024-07-18
Payer: COMMERCIAL

## 2024-07-18 VITALS
HEIGHT: 71 IN | OXYGEN SATURATION: 97 % | BODY MASS INDEX: 25.2 KG/M2 | HEART RATE: 113 BPM | DIASTOLIC BLOOD PRESSURE: 67 MMHG | SYSTOLIC BLOOD PRESSURE: 122 MMHG | RESPIRATION RATE: 16 BRPM | WEIGHT: 180 LBS | TEMPERATURE: 97.9 F

## 2024-07-18 VITALS — HEART RATE: 80 BPM | DIASTOLIC BLOOD PRESSURE: 80 MMHG | SYSTOLIC BLOOD PRESSURE: 118 MMHG

## 2024-07-18 DIAGNOSIS — Z00.00 ENCOUNTER FOR GENERAL ADULT MEDICAL EXAMINATION W/OUT ABNORMAL FINDINGS: ICD-10-CM

## 2024-07-18 DIAGNOSIS — F98.8 OTHER SPECIFIED BEHAVIORAL AND EMOTIONAL DISORDERS WITH ONSET USUALLY OCCURRING IN CHILDHOOD AND ADOLESCENCE: ICD-10-CM

## 2024-07-18 DIAGNOSIS — E55.9 VITAMIN D DEFICIENCY, UNSPECIFIED: ICD-10-CM

## 2024-07-18 PROCEDURE — 99395 PREV VISIT EST AGE 18-39: CPT | Mod: 25

## 2024-07-18 PROCEDURE — 36415 COLL VENOUS BLD VENIPUNCTURE: CPT

## 2024-07-22 ENCOUNTER — NON-APPOINTMENT (OUTPATIENT)
Age: 39
End: 2024-07-22

## 2024-07-23 LAB
25(OH)D3 SERPL-MCNC: 41.6 NG/ML
ALBUMIN SERPL ELPH-MCNC: 5 G/DL
ALP BLD-CCNC: 66 U/L
ALT SERPL-CCNC: 18 U/L
ANION GAP SERPL CALC-SCNC: 14 MMOL/L
AST SERPL-CCNC: 17 U/L
BASOPHILS # BLD AUTO: 0.04 K/UL
BASOPHILS NFR BLD AUTO: 0.6 %
BILIRUB SERPL-MCNC: 0.6 MG/DL
BUN SERPL-MCNC: 13 MG/DL
CALCIUM SERPL-MCNC: 10 MG/DL
CHLORIDE SERPL-SCNC: 100 MMOL/L
CHOLEST SERPL-MCNC: 198 MG/DL
CO2 SERPL-SCNC: 27 MMOL/L
CREAT SERPL-MCNC: 0.86 MG/DL
EGFR: 115 ML/MIN/1.73M2
EOSINOPHIL # BLD AUTO: 0.06 K/UL
EOSINOPHIL NFR BLD AUTO: 0.8 %
ESTIMATED AVERAGE GLUCOSE: 108 MG/DL
GLUCOSE SERPL-MCNC: 83 MG/DL
HBA1C MFR BLD HPLC: 5.4 %
HCT VFR BLD CALC: 43.5 %
HDLC SERPL-MCNC: 72 MG/DL
HGB BLD-MCNC: 14.3 G/DL
IMM GRANULOCYTES NFR BLD AUTO: 0.3 %
LDLC SERPL CALC-MCNC: 112 MG/DL
LYMPHOCYTES # BLD AUTO: 2.27 K/UL
LYMPHOCYTES NFR BLD AUTO: 32 %
MAN DIFF?: NORMAL
MCHC RBC-ENTMCNC: 29.7 PG
MCHC RBC-ENTMCNC: 32.9 GM/DL
MCV RBC AUTO: 90.2 FL
MONOCYTES # BLD AUTO: 0.5 K/UL
MONOCYTES NFR BLD AUTO: 7 %
NEUTROPHILS # BLD AUTO: 4.21 K/UL
NEUTROPHILS NFR BLD AUTO: 59.3 %
NONHDLC SERPL-MCNC: 126 MG/DL
PLATELET # BLD AUTO: 302 K/UL
POTASSIUM SERPL-SCNC: 4.2 MMOL/L
PROT SERPL-MCNC: 7.7 G/DL
RBC # BLD: 4.82 M/UL
RBC # FLD: 12.1 %
SODIUM SERPL-SCNC: 141 MMOL/L
TRIGL SERPL-MCNC: 81 MG/DL
TSH SERPL-ACNC: 0.6 UIU/ML
WBC # FLD AUTO: 7.1 K/UL